# Patient Record
Sex: FEMALE | Race: WHITE | NOT HISPANIC OR LATINO | Employment: OTHER | URBAN - METROPOLITAN AREA
[De-identification: names, ages, dates, MRNs, and addresses within clinical notes are randomized per-mention and may not be internally consistent; named-entity substitution may affect disease eponyms.]

---

## 2020-09-17 ENCOUNTER — TELEPHONE (OUTPATIENT)
Dept: OBGYN CLINIC | Facility: CLINIC | Age: 85
End: 2020-09-17

## 2020-09-22 ENCOUNTER — OFFICE VISIT (OUTPATIENT)
Dept: OBGYN CLINIC | Facility: CLINIC | Age: 85
End: 2020-09-22
Payer: MEDICARE

## 2020-09-22 VITALS
SYSTOLIC BLOOD PRESSURE: 138 MMHG | WEIGHT: 130 LBS | TEMPERATURE: 97.1 F | DIASTOLIC BLOOD PRESSURE: 78 MMHG | BODY MASS INDEX: 25.18 KG/M2

## 2020-09-22 DIAGNOSIS — N95.0 POST-MENOPAUSAL BLEEDING: Primary | ICD-10-CM

## 2020-09-22 DIAGNOSIS — T83.711A EROSION OF IMPLANTED VAGINAL MESH AND PROSTHETIC MATERIALS, INITIAL ENCOUNTER (HCC): ICD-10-CM

## 2020-09-22 PROCEDURE — 99202 OFFICE O/P NEW SF 15 MIN: CPT | Performed by: OBSTETRICS & GYNECOLOGY

## 2020-09-22 PROCEDURE — 88175 CYTOPATH C/V AUTO FLUID REDO: CPT | Performed by: OBSTETRICS & GYNECOLOGY

## 2020-09-22 PROCEDURE — 87624 HPV HI-RISK TYP POOLED RSLT: CPT | Performed by: OBSTETRICS & GYNECOLOGY

## 2020-09-22 RX ORDER — LEVOTHYROXINE SODIUM 88 MCG
TABLET ORAL
COMMUNITY
Start: 2020-07-01

## 2020-09-22 NOTE — PROGRESS NOTES
CC:  Postmenopausal bleeding    HPI: Mary Jane Berumen presents for evaluation of a small amount of bright red bleeding that has recently occurred  This patient who had a vaginal hysterectomy with subsequent use of mesh for repair, was doing well up until approximately 1 month ago  The patient noted occasional episodes of a very small amount of vaginal bleeding  This was painless, and definitely not urinary or bowel according to the patient  Past Medical History:  History reviewed  No pertinent past medical history  Past Surgical History:  No past surgical history on file  Past OB/Gyn History:  Refer to HPI  ALLERGIES:   Allergies   Allergen Reactions    Meperidine Hallucinations     Demerol      Rofecoxib Hives     VIOXX    Nitrofurantoin Swelling and Rash       MEDS:   Current Outpatient Medications:     Aspirin Buf,CaCarb-MgCarb-MgO, 81 MG TABS    Synthroid 88 MCG tablet    Review of Systems:  Skin: No rashes or discolorations of any concern  RESP: Denies SOB, no cough  CV: Denies chest pain or palpitations  Breasts: Denies masses, pain, skin changes and nipple discharge  GI: Denies abdominal pain, heartburn, nausea, vomiting, changes in bowel habits  : Denies dysuria, frequency, CVA tenderness, incontinence and hematuria  Genitalia: Denies abnormal vaginal discharge, external lesions, rashes, pelvic pain, pressure, positive for minimal amounts of vaginal bleeding  Rectal:  Denies pain, bleeding, hemorrhoids,    Physical Exam:  /78 (BP Location: Left arm, Patient Position: Sitting, Cuff Size: Adult)   Temp (!) 97 1 °F (36 2 °C)   Wt 59 kg (130 lb)   BMI 25 18 kg/m²    Gen: The patient was alert and oriented x3, pleasant well-appearing female in no acute distress  Abd:  Soft, nontender, nondistended, no masses or organomegaly  Back:  No CVA tenderness, no tenderness to palpation along spine  Pelvic atrophic appearing external female genitalia, no visible lesions, no rashes  Vagina is free of discharge, atrophic vaginal epithelium, no abnormal  lesions, no evidence of prolapse anteriorly or posteriorly  Approximately 1/3 proximal from the vaginal cuff, is a 3 mm bluish like area consistent with mesh erosion  No other areas of erosion are noted  A Pap smear the vaginal cuff is obtained  Surgical absence of the uterus and cervix is observed  Exam does not reveal any adnexal masses or tenderness  No anoperineal lesions  Skin:  No concerning lesions  Extremeties: No edema      Assessment & Plan:   1  Postmenopausal bleeding manifested because of mesh erosion in the anterior vaginal wall  Patient referred back to the gynecologist who originally performed the surgery for further recommendations

## 2020-10-02 LAB
HPV HR 12 DNA CVX QL NAA+PROBE: NEGATIVE
HPV16 DNA CVX QL NAA+PROBE: NEGATIVE
HPV18 DNA CVX QL NAA+PROBE: NEGATIVE
LAB AP GYN PRIMARY INTERPRETATION: NORMAL
Lab: NORMAL